# Patient Record
Sex: MALE | Race: WHITE | Employment: UNEMPLOYED | URBAN - METROPOLITAN AREA
[De-identification: names, ages, dates, MRNs, and addresses within clinical notes are randomized per-mention and may not be internally consistent; named-entity substitution may affect disease eponyms.]

---

## 2019-03-26 ENCOUNTER — OFFICE VISIT (OUTPATIENT)
Dept: BEHAVIORAL/MENTAL HEALTH CLINIC | Age: 19
End: 2019-03-26

## 2019-03-26 VITALS
HEIGHT: 67 IN | WEIGHT: 191 LBS | DIASTOLIC BLOOD PRESSURE: 72 MMHG | BODY MASS INDEX: 29.98 KG/M2 | HEART RATE: 91 BPM | SYSTOLIC BLOOD PRESSURE: 122 MMHG

## 2019-03-26 DIAGNOSIS — F39 MOOD DISORDER (HCC): Primary | ICD-10-CM

## 2019-03-26 DIAGNOSIS — F41.9 ANXIETY: ICD-10-CM

## 2019-06-24 ENCOUNTER — OFFICE VISIT (OUTPATIENT)
Dept: BEHAVIORAL/MENTAL HEALTH CLINIC | Age: 19
End: 2019-06-24

## 2019-06-24 VITALS
SYSTOLIC BLOOD PRESSURE: 124 MMHG | HEIGHT: 67 IN | DIASTOLIC BLOOD PRESSURE: 72 MMHG | WEIGHT: 177 LBS | BODY MASS INDEX: 27.78 KG/M2 | HEART RATE: 80 BPM

## 2019-06-24 DIAGNOSIS — F41.9 ANXIETY: ICD-10-CM

## 2019-06-24 DIAGNOSIS — F39 MOOD DISORDER (HCC): Primary | ICD-10-CM

## 2019-06-24 RX ORDER — QUETIAPINE FUMARATE 200 MG/1
200 TABLET, FILM COATED ORAL
Qty: 15 TAB | Refills: 0 | Status: SHIPPED | OUTPATIENT
Start: 2019-06-24 | End: 2019-08-26

## 2019-06-24 RX ORDER — DOXEPIN HYDROCHLORIDE 10 MG/1
10 CAPSULE ORAL
Qty: 30 CAP | Refills: 1 | Status: SHIPPED | OUTPATIENT
Start: 2019-06-24 | End: 2019-07-01 | Stop reason: SINTOL

## 2019-06-24 RX ORDER — QUETIAPINE FUMARATE 100 MG/1
100 TABLET, FILM COATED ORAL
Qty: 15 TAB | Refills: 0 | Status: SHIPPED | OUTPATIENT
Start: 2019-07-08 | End: 2019-07-02

## 2019-06-24 NOTE — PROGRESS NOTES
CHIEF COMPLAINT:  Paloma West is a 20 y.o. male and was seen today for follow-up of psychiatric condition and psychotropic medication management. HPI:    Eliu Nichole reports the following psychiatric symptoms:  depression, anxiety and focus/concentration. The symptoms have been present for months and are of moderate/high severity. The symptoms occur daily. Pt reports noticing anxiety and problems with focus and concentration most significantly. He denies feeling depressed although neuropsych testing picked up on depression symptoms and mother reports noticing depression. Pt reports benefit from use of vyvanse. He states seroquel helps with sleep but does not impact mood. He is here today with his mother to review current treatment plan. FAMILY/SOCIAL HX: denies updates    REVIEW OF SYSTEMS:  Psychiatric:  depression, ADHD, anxiety  Appetite:no change from normal   Sleep: poor with DIMS (difficulty initiating & maintaining sleep) without use of seroquel  Neuro: h/o concussion    Visit Vitals  /72 (BP 1 Location: Left arm, BP Patient Position: Sitting)   Pulse 80   Ht 5' 7\" (1.702 m)   Wt 80.3 kg (177 lb)   BMI 27.72 kg/m²       Side Effects:  none    MENTAL STATUS EXAM:   Sensorium  oriented to time, place and person   Relations cooperative and guarded   Appearance:  age appropriate and casually dressed   Motor Behavior:  hypoactive and within normal limits   Speech:  hypoverbal, monotone and soft   Thought Process: goal directed   Thought Content free of delusions and free of hallucinations   Suicidal ideations no intention   Homicidal ideations no intention   Mood:  anxious   Affect:  anxious, constricted and depressed   Memory recent  adequate   Memory remote:  adequate   Concentration:  adequate   Abstraction:  abstract   Insight:  fair and limited   Reliability fair   Judgment:  fair     MEDICAL DECISION MAKING:  Problems addressed today:    ICD-10-CM ICD-9-CM    1.  Mood disorder ([facility].) F39 296.90 lisdexamfetamine (VYVANSE) 30 mg capsule   2. Anxiety F41.9 300.00        Assessment:   Artice Heimlich is responding to treatment somewhat. Currently adhd symptoms are improved. He continues to have anxiety and atypical depression symptoms. Reviewed neuropsych test results from Dr Jack Lipscomb and pt with ADHD-inattentive type, Major Depression Severe and Anxiety Disorder NOS per test results. As mentioned above pt denies \"feeling depressed\" and PHQ-9 score was elevated at 12 but technically a negative screen. Reviewed risk of using seroquel for sleep only. Agreed to a trial of doxepin and educated pt on potential benefit for depression and anxiety. Will continue with use of vyvanse as he has noticed significant benefit. Reviewed treatment goals and answered questions. Plan:   1. Current Outpatient Medications   Medication Sig Dispense Refill    QUEtiapine (SEROQUEL) 200 mg tablet Take 1 Tab by mouth nightly. 15 Tab 0    [START ON 7/8/2019] QUEtiapine (SEROQUEL) 100 mg tablet Take 1 Tab by mouth nightly. 15 Tab 0    doxepin (SINEQUAN) 10 mg capsule Take 1 Cap by mouth nightly. 30 Cap 1    [START ON 7/24/2019] lisdexamfetamine (VYVANSE) 30 mg capsule Take 1 Cap by mouth daily. Max Daily Amount: 30 mg. 90 Cap 0    aspirin/acetaminophen/caffeine (EXCEDRIN MIGRAINE PO) Take  by mouth.  lisdexamfetamine (VYVANSE) 30 mg capsule Take 1 Cap by mouth every morning. Max Daily Amount: 30 mg. 30 Cap 0    gabapentin (NEURONTIN) 100 mg capsule Take 100 mg by mouth three (3) times daily.  SUMATRIPTAN SUCCINATE (IMITREX PO) Take  by mouth.  OTHER Migralief            medication changes made today: cont vyvanse 30 mg, begin weaning off of seroquel 300 mg, start trial of doxepin 10 mg    2. Counseling and coordination of care including instructions for treatment, risks/benefits, risk factor reduction and patient/family education. He agrees with the plan.  Patient instructed to call with any side effects, questions or issues.      3.    Follow-up and Dispositions    · Return in about 8 weeks (around 8/19/2019).         6/24/2019  Jamari Reyes NP

## 2019-06-28 ENCOUNTER — TELEPHONE (OUTPATIENT)
Dept: BEHAVIORAL/MENTAL HEALTH CLINIC | Age: 19
End: 2019-06-28

## 2019-06-28 NOTE — TELEPHONE ENCOUNTER
Pt's mother left vm regarding doxepin. She said pt said its making him nauseous to the point where he has stopped taking it. Asking for an alternative? Mom can be reached on [phone] or pt can be reached on [phone].

## 2019-07-02 ENCOUNTER — OFFICE VISIT (OUTPATIENT)
Dept: BEHAVIORAL/MENTAL HEALTH CLINIC | Age: 19
End: 2019-07-02

## 2019-07-02 VITALS
SYSTOLIC BLOOD PRESSURE: 119 MMHG | WEIGHT: 174 LBS | DIASTOLIC BLOOD PRESSURE: 76 MMHG | BODY MASS INDEX: 27.31 KG/M2 | HEART RATE: 101 BPM | HEIGHT: 67 IN

## 2019-07-02 DIAGNOSIS — F33.1 MODERATE RECURRENT MAJOR DEPRESSION (HCC): Primary | ICD-10-CM

## 2019-07-02 DIAGNOSIS — F41.1 GENERALIZED ANXIETY DISORDER: ICD-10-CM

## 2019-07-02 RX ORDER — MIRTAZAPINE 7.5 MG/1
7.5 TABLET, FILM COATED ORAL
Qty: 30 TAB | Refills: 1 | Status: SHIPPED | OUTPATIENT
Start: 2019-07-02 | End: 2019-07-03 | Stop reason: SDUPTHER

## 2019-07-02 NOTE — PROGRESS NOTES
CHIEF COMPLAINT:  Felipe Gibson is a 20 y.o. male and was seen today for follow-up of psychiatric condition and psychotropic medication management. HPI:    Cande Fitzpatrick reports the following psychiatric symptoms:  Depression, insomnia, anxiety and focus/concentration. The symptoms have been present for months and are of moderate/high severity. The symptoms occur daily. Pt reports he tried doxepin but had significant SE's from use of medication and still did not sleep. He reports a 4-5 year hx of significant depression and anxiety and stated he did notice with doxepin a \"flicker of feeling better\". Pt here today to review current treatment plan. FAMILY/SOCIAL HX: supportive mother    REVIEW OF SYSTEMS:  Psychiatric:  depression, ADHD, anxiety  Appetite:decreased   Sleep: poor with DIMS (difficulty initiating & maintaining sleep), has been using seroquel with benefit for sleep only   Neuro: h/o concussion    Visit Vitals  /76   Pulse (!) 101   Ht 5' 7\" (1.702 m)   Wt 78.9 kg (174 lb)   BMI 27.25 kg/m²       Side Effects:  Severe nausea from use doxepin    MENTAL STATUS EXAM:   Sensorium  oriented to time, place and person   Relations cooperative   Appearance:  age appropriate and casually dressed   Motor Behavior:  gait stable and within normal limits   Speech:  normal volume   Thought Process: goal directed   Thought Content free of delusions and free of hallucinations   Suicidal ideations no intention   Homicidal ideations no intention   Mood:  anxious and depressed   Affect:  anxious and depressed   Memory recent  adequate   Memory remote:  adequate   Concentration:  adequate with adhd medication   Abstraction:  abstract   Insight:  fair   Reliability fair   Judgment:  fair     MEDICAL DECISION MAKING:  Problems addressed today:    ICD-10-CM ICD-9-CM    1. Moderate recurrent major depression (HCC) F33.1 296.32     R/O bipolar disorder   2.  Generalized anxiety disorder F41.1 300.02        Assessment: Aabenraa is responding to adhd treatment overall. Pt continues with untreated mood disorder. Symptoms present as major depression and DASHAWN. He has had a 4-5 year hx of depression that has significantly impacted his life geraldo socially. As noted he has benefited from use of a stimulant. When he tried doxepin for sleep due to possible SE's of seroquel he experienced sig nausea. Reviewed symptoms and hx of mood presentation. There is a possibility that pt may have more of a bipolar presentation. Reviewed med options and for sleep geraldo ones with possible benefit for depression and anxiety. Will use a trial of mirtazapine. Will monitor mood and response to current trial. Reviewed treatment goals in full and answered questions. Plan:   1. Current Outpatient Medications   Medication Sig Dispense Refill    mirtazapine (REMERON) 7.5 mg tablet Take 1 Tab by mouth nightly. 30 Tab 1    QUEtiapine (SEROQUEL) 200 mg tablet Take 1 Tab by mouth nightly. 15 Tab 0    aspirin/acetaminophen/caffeine (EXCEDRIN MIGRAINE PO) Take  by mouth.  lisdexamfetamine (VYVANSE) 30 mg capsule Take 1 Cap by mouth every morning. Max Daily Amount: 30 mg. 30 Cap 0    OTHER Migralief      gabapentin (NEURONTIN) 100 mg capsule Take 100 mg by mouth three (3) times daily.  SUMATRIPTAN SUCCINATE (IMITREX PO) Take  by mouth.            medication changes made today: dc doxepin, cont vyvanse 30 mg, hold seroquel, start trial of mirtazapine 7.5 mg    2. Counseling and coordination of care including instructions for treatment, risks/benefits, risk factor reduction and patient/family education. He agrees with the plan. Patient instructed to call with any side effects, questions or issues.      3.    Follow-up and Dispositions    · Return in about 1 month (around 7/30/2019).         7/2/2019  Lorraine Gibbons NP

## 2019-07-03 ENCOUNTER — TELEPHONE (OUTPATIENT)
Dept: BEHAVIORAL/MENTAL HEALTH CLINIC | Age: 19
End: 2019-07-03

## 2019-07-03 RX ORDER — MIRTAZAPINE 15 MG/1
15 TABLET, FILM COATED ORAL
Qty: 30 TAB | Refills: 1 | Status: SHIPPED | OUTPATIENT
Start: 2019-07-03 | End: 2019-08-05 | Stop reason: SDUPTHER

## 2019-07-03 NOTE — TELEPHONE ENCOUNTER
Pt called to say the mirtazpine seems to be working but doesn't seem strong enough. Wants to know if he can have a stronger dose.     [phone]

## 2019-07-11 ENCOUNTER — TELEPHONE (OUTPATIENT)
Dept: BEHAVIORAL/MENTAL HEALTH CLINIC | Age: 19
End: 2019-07-11

## 2019-07-11 NOTE — TELEPHONE ENCOUNTER
Pt called to say the mirtazpine is not strong enough. Wants to increase the dose. This was just increased last week on 7/3 as well since it wasn't working.     [phone]

## 2019-08-05 ENCOUNTER — TELEPHONE (OUTPATIENT)
Dept: BEHAVIORAL/MENTAL HEALTH CLINIC | Age: 19
End: 2019-08-05

## 2019-08-05 RX ORDER — MIRTAZAPINE 30 MG/1
30 TABLET, FILM COATED ORAL
Qty: 30 TAB | Refills: 0 | Status: SHIPPED | OUTPATIENT
Start: 2019-08-05 | End: 2019-08-26 | Stop reason: SDUPTHER

## 2019-08-26 ENCOUNTER — OFFICE VISIT (OUTPATIENT)
Dept: BEHAVIORAL/MENTAL HEALTH CLINIC | Age: 19
End: 2019-08-26

## 2019-08-26 VITALS
DIASTOLIC BLOOD PRESSURE: 66 MMHG | HEART RATE: 92 BPM | WEIGHT: 176 LBS | BODY MASS INDEX: 27.62 KG/M2 | HEIGHT: 67 IN | SYSTOLIC BLOOD PRESSURE: 101 MMHG

## 2019-08-26 DIAGNOSIS — F33.1 MODERATE RECURRENT MAJOR DEPRESSION (HCC): Primary | ICD-10-CM

## 2019-08-26 DIAGNOSIS — F39 MOOD DISORDER (HCC): ICD-10-CM

## 2019-08-26 DIAGNOSIS — F41.1 GENERALIZED ANXIETY DISORDER: ICD-10-CM

## 2019-08-26 RX ORDER — MIRTAZAPINE 45 MG/1
45 TABLET, FILM COATED ORAL
Qty: 30 TAB | Refills: 1 | Status: SHIPPED | OUTPATIENT
Start: 2019-08-26 | End: 2019-11-18 | Stop reason: SDUPTHER

## 2019-08-26 NOTE — PROGRESS NOTES
CHIEF COMPLAINT:  Brando Ochoa is a 20 y.o. male and was seen today for follow-up of psychiatric condition and psychotropic medication management. HPI:    Anthony Zhang reports the following psychiatric symptoms:  depression and anxiety. The symptoms have been present for months and are of moderate/high severity. The symptoms occur less frequently and less severely overall. Pt reports depression symptoms have been stabilizing with some intermittent episodes of moderate severity anxiety. Overall he reports benefit from use of mirtazapine. He stopped seroquel and reports benefit from current meds. Pt here today to review current treatment plan. PHQ-9: 4, negative screen, mild symptoms  HAM-A: 9, mild anxiety    FAMILY/SOCIAL HX: no changes reported    REVIEW OF SYSTEMS:  Psychiatric:  depression, anxiety  Appetite:improving   Sleep: improving   Neuro: denies    Visit Vitals  /66   Pulse 92   Ht 5' 7\" (1.702 m)   Wt 79.8 kg (176 lb)   BMI 27.57 kg/m²       Side Effects:  none    MENTAL STATUS EXAM:   Sensorium  oriented to time, place and person   Relations cooperative   Appearance:  age appropriate and casually dressed   Motor Behavior:  gait stable and within normal limits   Speech:  Soft/monotone   Thought Process: goal directed   Thought Content free of delusions and free of hallucinations   Suicidal ideations no intention   Homicidal ideations no intention   Mood:  anxious   Affect:  anxious   Memory recent  adequate   Memory remote:  adequate   Concentration:  adequate   Abstraction:  abstract   Insight:  fair and good   Reliability good and fair   Judgment:  fair and good     MEDICAL DECISION MAKING:  Problems addressed today:    ICD-10-CM ICD-9-CM    1. Moderate recurrent major depression (HCC) F33.1 296.32    2. Generalized anxiety disorder F41.1 300.02    3. Mood disorder (HCC) F39 296.90 lisdexamfetamine (VYVANSE) 30 mg capsule       Assessment:   Anthony Zhang is responding to treatment overall.  Pt reports symptoms are improving with current medications. Reviewed current meds and will increase mirtazapine at this time. Reviewed treatment goals and discussed dosing options. Will first assess anxiety response to higher dose of mirtazapine. Plan:   1. Current Outpatient Medications   Medication Sig Dispense Refill    mirtazapine (REMERON) 45 mg tablet Take 1 Tab by mouth nightly. 30 Tab 1    lisdexamfetamine (VYVANSE) 30 mg capsule Take 1 Cap by mouth every morning. Max Daily Amount: 30 mg. 30 Cap 0    aspirin/acetaminophen/caffeine (EXCEDRIN MIGRAINE PO) Take  by mouth.  gabapentin (NEURONTIN) 100 mg capsule Take 100 mg by mouth three (3) times daily.  SUMATRIPTAN SUCCINATE (IMITREX PO) Take  by mouth.  OTHER Migralief            medication changes made today: inc mirtazapine 45 mg (discussed possibility of breaking dose up if needed), cont vyvanse 30 mg (discussed inc dose if needed)    2. Counseling and coordination of care including instructions for treatment, risks/benefits, risk factor reduction and patient/family education. He agrees with the plan. Patient instructed to call with any side effects, questions or issues.      3.    Follow-up and Dispositions    · Return in about 3 months (around 11/26/2019).         8/26/2019  Rogelio Bird NP

## 2019-09-12 ENCOUNTER — TELEPHONE (OUTPATIENT)
Dept: BEHAVIORAL/MENTAL HEALTH CLINIC | Age: 19
End: 2019-09-12

## 2019-09-12 NOTE — TELEPHONE ENCOUNTER
Returned call. Discussed possibility pt has been using substance. Discussed impact of some substances on neurotransmitter system and possibility of recurrent anxiety/depression. Agreed to work pt in on cancellation list. Pt's mother says she is worried about him but denies concern that he would act on harming himself. Informed her if she starts to worry about self harm she should take him to ED or call Crisis.

## 2019-09-12 NOTE — TELEPHONE ENCOUNTER
Pt's mother called back, gave more details. Judie Quiroga pt is very anxious. Staying in room with door closed more often. Pulling off the side of the road while driving per anxious. Possibly doing marijuana again. Mom said pt said \"these meds shahid been taking for the last 6 years make me wanna kill myself. \"    Pt is on cx list to be seen sooner next week when appt opens and mom is aware of this.     [address] [place] [address]

## 2019-09-12 NOTE — TELEPHONE ENCOUNTER
Pt's mother left vm regarding pt. Ryan Jacquelynbetsy he is still having a lot of anxiety. Wants him to be seen sooner or have something else prescribed. Mom said she is very concerned about him.     [phone]

## 2019-09-18 ENCOUNTER — OFFICE VISIT (OUTPATIENT)
Dept: BEHAVIORAL/MENTAL HEALTH CLINIC | Age: 19
End: 2019-09-18

## 2019-09-18 VITALS
DIASTOLIC BLOOD PRESSURE: 77 MMHG | HEIGHT: 70 IN | SYSTOLIC BLOOD PRESSURE: 128 MMHG | HEART RATE: 78 BPM | BODY MASS INDEX: 25.05 KG/M2 | WEIGHT: 175 LBS

## 2019-09-18 DIAGNOSIS — F33.1 MODERATE RECURRENT MAJOR DEPRESSION (HCC): Primary | ICD-10-CM

## 2019-09-18 DIAGNOSIS — F41.1 GENERALIZED ANXIETY DISORDER: ICD-10-CM

## 2019-09-18 DIAGNOSIS — F39 MOOD DISORDER (HCC): ICD-10-CM

## 2019-09-18 RX ORDER — BUSPIRONE HYDROCHLORIDE 10 MG/1
10 TABLET ORAL 2 TIMES DAILY
Qty: 60 TAB | Refills: 1 | Status: SHIPPED | OUTPATIENT
Start: 2019-09-18 | End: 2019-10-30 | Stop reason: SINTOL

## 2019-09-18 NOTE — PROGRESS NOTES
CHIEF COMPLAINT:  Dayana Gallardo is a 20 y.o. male and was seen today for follow-up of psychiatric condition and psychotropic medication management. HPI:    Mahamed Hernandez reports the following psychiatric symptoms: focus/concentration, depression and anxiety. The symptoms have been present for months and are of moderate/high severity. The depression symptoms occur less frequently and less severely overall. Pt reports anxiety symptoms though are exacerbated. He reports benefit from current medications but is here today to review current treatment plan and explore options for anxiety management. FAMILY/SOCIAL HX: no changes, reports work is stressful    REVIEW OF SYSTEMS:  Psychiatric:  depression, ADHD, anxiety  Appetite:no change from normal   Sleep: improved   Neuro: denies    Visit Vitals  /77 (BP 1 Location: Left arm, BP Patient Position: Sitting)   Pulse 78   Ht 5' 10\" (1.778 m)   Wt 79.4 kg (175 lb)   BMI 25.11 kg/m²       Side Effects:  none    MENTAL STATUS EXAM:   Sensorium  oriented to time, place and person   Relations cooperative, vague and \"quiet\"   Appearance:  age appropriate and casually dressed   Motor Behavior:  gait stable and within normal limits   Speech:  soft and barely audible   Thought Process: goal directed   Thought Content free of delusions and free of hallucinations   Suicidal ideations no intention   Homicidal ideations no intention   Mood:  anxious   Affect:  anxious   Memory recent  adequate   Memory remote:  adequate   Concentration:  adequate   Abstraction:  abstract   Insight:  fair   Reliability fair   Judgment:  fair     MEDICAL DECISION MAKING:  Problems addressed today:    ICD-10-CM ICD-9-CM    1. Moderate recurrent major depression (HCC) F33.1 296.32    2. Generalized anxiety disorder F41.1 300.02    3. Mood disorder (HCC) F39 296.90 lisdexamfetamine (VYVANSE) 30 mg capsule       Assessment:   Mahamed Hernandez is responding to treatment overall.  He reports depression is improved. He states he has been experiencing increased anxiety. Reviewed current response and possibility of additive factors. Pt has been using caffeine throughout the day on a daily basis. He reports he has some intermittent THC use as well. Reviewed caffien usage and discussed strategies to reduce daily amount as this may be contributing to anxiety. Pt also advised not to use THC as it may be contributing to changes to meds/response. Discussed adding buspar as anxiety has been chronic. Pt would like to start at this time. Reviewed treatment goals and symptoms to monitor for. Plan:   1. Current Outpatient Medications   Medication Sig Dispense Refill    busPIRone (BUSPAR) 10 mg tablet Take 1 Tab by mouth two (2) times a day. 60 Tab 1    [START ON 9/26/2019] lisdexamfetamine (VYVANSE) 30 mg capsule Take 1 Cap by mouth daily. Max Daily Amount: 30 mg. 30 Cap 0    mirtazapine (REMERON) 45 mg tablet Take 1 Tab by mouth nightly. 30 Tab 1    aspirin/acetaminophen/caffeine (EXCEDRIN MIGRAINE PO) Take  by mouth.            medication changes made today: mirtazapine 45 mg, cont vyvanse 30 mg, add buspar 10 mg bid, consider rexulti if needed    2. Counseling and coordination of care including instructions for treatment, risks/benefits, risk factor reduction and patient/family education. He agrees with the plan. Patient instructed to call with any side effects, questions or issues. 3.    Follow-up and Dispositions    · Return in about 8 weeks (around 11/13/2019).          9/18/2019  Violet Landin NP

## 2019-10-07 ENCOUNTER — TELEPHONE (OUTPATIENT)
Dept: BEHAVIORAL/MENTAL HEALTH CLINIC | Age: 19
End: 2019-10-07

## 2019-10-07 NOTE — TELEPHONE ENCOUNTER
Patient calls to report the Buspar doesn't seem to be working, wondering if the dose should be increased.

## 2019-10-07 NOTE — TELEPHONE ENCOUNTER
Returned call. Informed pt he can increase Buspar to 15 mg BID. He will call with feedback in 1-2 weeks.

## 2019-10-14 ENCOUNTER — TELEPHONE (OUTPATIENT)
Dept: BEHAVIORAL/MENTAL HEALTH CLINIC | Age: 19
End: 2019-10-14

## 2019-10-14 NOTE — TELEPHONE ENCOUNTER
Noted. Will review anxiety symptoms at next appointment. If pt continues to have symptoms please have him move f/u appt sooner. Thank you.

## 2019-10-14 NOTE — TELEPHONE ENCOUNTER
Pt called to say the buspar still isnt working even after the increase. Shayna Villela its making him dizzy. Said he stopped taking it.       [phone]

## 2019-10-29 ENCOUNTER — TELEPHONE (OUTPATIENT)
Dept: BEHAVIORAL/MENTAL HEALTH CLINIC | Age: 19
End: 2019-10-29

## 2019-10-29 NOTE — TELEPHONE ENCOUNTER
Pt calls to say buspar not helping. Its making him really dizzy. Pt is scheduled for an appt in 3 weeks.     [phone]

## 2019-10-30 NOTE — TELEPHONE ENCOUNTER
Returned call. Pt reports SE's from buspar so encouraged him to dc at this time. Asked pt to call  in am and move f/u appt sooner. He agreed to do so. Will consider gabapentin for chronic anxiety.

## 2019-11-18 ENCOUNTER — OFFICE VISIT (OUTPATIENT)
Dept: BEHAVIORAL/MENTAL HEALTH CLINIC | Age: 19
End: 2019-11-18

## 2019-11-18 VITALS
DIASTOLIC BLOOD PRESSURE: 81 MMHG | SYSTOLIC BLOOD PRESSURE: 111 MMHG | WEIGHT: 179 LBS | BODY MASS INDEX: 25.62 KG/M2 | HEART RATE: 83 BPM | HEIGHT: 70 IN

## 2019-11-18 DIAGNOSIS — F39 MOOD DISORDER (HCC): ICD-10-CM

## 2019-11-18 DIAGNOSIS — F90.8 ATTENTION DEFICIT HYPERACTIVITY DISORDER (ADHD), OTHER TYPE: ICD-10-CM

## 2019-11-18 DIAGNOSIS — F33.1 MODERATE RECURRENT MAJOR DEPRESSION (HCC): Primary | ICD-10-CM

## 2019-11-18 DIAGNOSIS — F41.1 GENERALIZED ANXIETY DISORDER: ICD-10-CM

## 2019-11-18 RX ORDER — MIRTAZAPINE 30 MG/1
60 TABLET, FILM COATED ORAL
Qty: 60 TAB | Refills: 2 | Status: SHIPPED | OUTPATIENT
Start: 2019-11-18 | End: 2020-01-29 | Stop reason: SDUPTHER

## 2019-11-18 RX ORDER — HYDROXYZINE PAMOATE 25 MG/1
25 CAPSULE ORAL
Qty: 60 CAP | Refills: 2 | Status: SHIPPED | OUTPATIENT
Start: 2019-11-18 | End: 2020-01-29

## 2019-11-18 RX ORDER — BUSPIRONE HYDROCHLORIDE 10 MG/1
10 TABLET ORAL
Qty: 30 TAB | Refills: 1 | Status: SHIPPED | OUTPATIENT
Start: 2019-11-18 | End: 2020-01-29 | Stop reason: SDUPTHER

## 2019-11-18 NOTE — PROGRESS NOTES
CHIEF COMPLAINT:  Ada Gutierrez is a 20 y.o. male and was seen today for follow-up of psychiatric condition and psychotropic medication management. HPI:    Valentín Thomas reports the following psychiatric symptoms:  depression, anxiety and focus and concentration. The symptoms have been present for months and are of moderate/high severity. The symptoms occur less severely and frequently overall. Pt reports some ongoing issues with anxiety. He has had some benefit from use of buspar but he notices SE's when he takes it during the daytime. Overall he reports benefit from current medications. He is here today to review current treatment plan. FAMILY/SOCIAL HX: denies new stressors/changes    REVIEW OF SYSTEMS:  Psychiatric:  depression, anxiety, adhd  Appetite:good, improved   Sleep: improved, has been using buspar at night with benefit   Neuro: hx concussions    Visit Vitals  /81 (BP 1 Location: Left arm, BP Patient Position: Sitting)   Pulse 83   Ht 5' 10\" (1.778 m)   Wt 81.2 kg (179 lb)   BMI 25.68 kg/m²       Side Effects:  dizziness    MENTAL STATUS EXAM:   Sensorium  oriented to time, place and person   Relations cooperative   Appearance:  age appropriate and casually dressed   Motor Behavior:  gait stable and within normal limits   Speech:  hypoverbal, monotone and soft   Thought Process: goal directed   Thought Content free of delusions and free of hallucinations   Suicidal ideations no intention   Homicidal ideations no intention   Mood:  anxious   Affect:  Anxious, sad, constricted   Memory recent  adequate   Memory remote:  adequate   Concentration:  adequate and impaired   Abstraction:  abstract and concrete   Insight:  fair   Reliability fair/limited   Judgment:  fair and limited     MEDICAL DECISION MAKING:  Problems addressed today:    ICD-10-CM ICD-9-CM    1. Moderate recurrent major depression (HCC) F33.1 296.32    2. Generalized anxiety disorder F41.1 300.02    3.  Attention deficit hyperactivity disorder (ADHD), other type F90.8 314.01    4. Mood disorder (formerly [facility]) F39 296.90 lisdexamfetamine (VYVANSE) 30 mg capsule       Assessment:   Winston Simental is responding to treatment overall. Mirtazapine and vyvanse have been beneficial for overall depression and adhd symptoms. Pt still with ongoing anxiety. Reviewed current response to medication and SE's. For now will use buspar prn sleep as this has been beneficial. Will increase mirtazapine for depression and anxiety benefit. Will add prn hydroxyzine for anxiety. Will also consider lowering stimulant dose if needed. Discussed polypharmacy and dc'ing buspar if hydroxyzine works well. Discussed use of THC and pt states he stopped use approx 1 month ago. He agrees to monitor response and SE's. Reviewed treatment goals and answered questions. Provided support and encouragement. Plan:   1. Current Outpatient Medications   Medication Sig Dispense Refill    mirtazapine (REMERON) 30 mg tablet Take 2 Tabs by mouth nightly. 60 Tab 2    lisdexamfetamine (VYVANSE) 30 mg capsule Take 1 Cap by mouth daily. Max Daily Amount: 30 mg. 30 Cap 0    busPIRone (BUSPAR) 10 mg tablet Take 1 Tab by mouth nightly as needed (anxiety/sleep). 30 Tab 1    hydrOXYzine pamoate (VISTARIL) 25 mg capsule Take 1 Cap by mouth two (2) times daily as needed for Anxiety. 60 Cap 2    aspirin/acetaminophen/caffeine (EXCEDRIN MIGRAINE PO) Take  by mouth.            medication changes made today: inc mirtazapine 60 mg, cont vyvanse 30 mg, cont buspar 10 mg prn bedtime, add hydroxyzinr 25 mg bid prn, consider rexulti if needed, consider lowering stimulant dose, consider pharmacogenomic test    2. Counseling and coordination of care including instructions for treatment, risks/benefits, risk factor reduction and patient/family education. He agrees with the plan. Patient instructed to call with any side effects, questions or issues.      3.    Follow-up and Dispositions    · Return in about 2 months (around 1/18/2020).          11/18/2019  Lacey Okeefe, NP

## 2020-01-06 DIAGNOSIS — F39 MOOD DISORDER (HCC): ICD-10-CM

## 2020-01-29 ENCOUNTER — OFFICE VISIT (OUTPATIENT)
Dept: BEHAVIORAL/MENTAL HEALTH CLINIC | Age: 20
End: 2020-01-29

## 2020-01-29 VITALS
DIASTOLIC BLOOD PRESSURE: 77 MMHG | WEIGHT: 170 LBS | HEART RATE: 91 BPM | HEIGHT: 70 IN | BODY MASS INDEX: 24.34 KG/M2 | SYSTOLIC BLOOD PRESSURE: 119 MMHG

## 2020-01-29 DIAGNOSIS — F90.8 ATTENTION DEFICIT HYPERACTIVITY DISORDER (ADHD), OTHER TYPE: ICD-10-CM

## 2020-01-29 DIAGNOSIS — F33.1 MODERATE RECURRENT MAJOR DEPRESSION (HCC): Primary | ICD-10-CM

## 2020-01-29 DIAGNOSIS — F41.1 GENERALIZED ANXIETY DISORDER: ICD-10-CM

## 2020-01-29 RX ORDER — BUSPIRONE HYDROCHLORIDE 10 MG/1
10 TABLET ORAL
Qty: 30 TAB | Refills: 1 | Status: SHIPPED | OUTPATIENT
Start: 2020-01-29 | End: 2020-04-01 | Stop reason: SDUPTHER

## 2020-01-29 RX ORDER — MIRTAZAPINE 30 MG/1
60 TABLET, FILM COATED ORAL
Qty: 60 TAB | Refills: 2 | Status: SHIPPED | OUTPATIENT
Start: 2020-01-29 | End: 2020-04-01 | Stop reason: SDUPTHER

## 2020-01-29 RX ORDER — AMPHETAMINE SULFATE 5 MG/1
5 TABLET ORAL 2 TIMES DAILY
Qty: 60 TAB | Refills: 0 | Status: SHIPPED | OUTPATIENT
Start: 2020-01-29 | End: 2020-04-01 | Stop reason: DRUGHIGH

## 2020-01-29 NOTE — PROGRESS NOTES
CHIEF COMPLAINT:  Elidia Rankin is a 20 y.o. male and was seen today for follow-up of psychiatric condition and psychotropic medication management. HPI:    Kaela Smyth reports the following psychiatric symptoms:  depression, anxiety and focus and concentration. The symptoms have been present for months and are of moderate severity. The symptoms occur daily overall. Pt reports benefit from use of vyvanse and mirtazapine but reports ongoing anhedonia. He states he notices he feels better with use of vyvanse but finds it wears off and then he has no motivation/energy. Overall he reports mirtazapine and buspar have been effective for depression and anxiety. He is here today to review current treatment plan. FAMILY/SOCIAL HX: works FT, limited social connections    REVIEW OF SYSTEMS:  Psychiatric:  depression, ADHD, anxiety  Appetite:good/fair   Sleep: improved with use of mirtazapine  Neuro: hx of concussion    Visit Vitals  /77 (BP 1 Location: Left arm, BP Patient Position: Sitting)   Pulse 91   Ht 5' 10\" (1.778 m)   Wt 77.1 kg (170 lb)   BMI 24.39 kg/m²       Side Effects:  increased HR with vyvanse at times    MENTAL STATUS EXAM:   Sensorium  oriented to time, place and person   Relations cooperative   Appearance:  age appropriate and casually dressed   Motor Behavior:  gait stable and within normal limits   Speech:  monotone and soft   Thought Process: goal directed   Thought Content free of delusions and free of hallucinations   Suicidal ideations no intention   Homicidal ideations no intention   Mood:  anxious and depressed   Affect:  anxious and depressed   Memory recent  adequate   Memory remote:  adequate   Concentration:  impaired   Abstraction:  abstract   Insight:  fair   Reliability fair   Judgment:  fair     MEDICAL DECISION MAKING:  Problems addressed today:    ICD-10-CM ICD-9-CM    1. Moderate recurrent major depression (HCC) F33.1 296.32    2. Generalized anxiety disorder F41.1 300.02    3. Attention deficit hyperactivity disorder (ADHD), other type F90.8 314.01 amphetamine sulfate (EVEKEO) 5 mg tab       Assessment:   Otto Mullen is responding to treatment somewhat. Currently anhedonia symptoms are present. He reports mood feels lass sad and overall he is less anxious. Reviewed current medications and he reports benefit from all medications but has noticed increased HR at times with use of vyvanse. Discussed management of anhedonia while trying to limit polypharmacy. At this time will switch vyvanse to [facility] and use bid dosing to give better coverage. If this does not work well may consider use of abilify. Reviewed treatment goals and target symptoms. Pt will contact office if there are issues with switching stimulant. He denies use of THC. Provided support and answered questions. Will monitor VS which are currently stable with intermittent increased HR per patient. Plan:   1. Current Outpatient Medications   Medication Sig Dispense Refill    amphetamine sulfate (EVEKEO) 5 mg tab Take 5 mg by mouth two (2) times a day. Max Daily Amount: 10 mg. 60 Tab 0    mirtazapine (REMERON) 30 mg tablet Take 2 Tabs by mouth nightly. 60 Tab 2    busPIRone (BUSPAR) 10 mg tablet Take 1 Tab by mouth nightly as needed (anxiety/sleep). 30 Tab 1    lisdexamfetamine (VYVANSE) 30 mg capsule Take 1 Cap by mouth daily. Max Daily Amount: 30 mg. 30 Cap 0    aspirin/acetaminophen/caffeine (EXCEDRIN MIGRAINE PO) Take  by mouth.            medication changes made today: cont mirtazapine 30 mg, cont buspar 10 mg nightly as needed, switch vyvanse to evekeo 10 mg BID    2. Counseling and coordination of care including instructions for treatment, risks/benefits, risk factor reduction and patient/family education. He agrees with the plan. Patient instructed to call with any side effects, questions or issues. 3.    Follow-up and Dispositions    · Return in about 8 weeks (around 3/25/2020).          1/29/2020  Christina Birmingham Nonda Share, NP

## 2020-04-01 ENCOUNTER — VIRTUAL VISIT (OUTPATIENT)
Dept: BEHAVIORAL/MENTAL HEALTH CLINIC | Age: 20
End: 2020-04-01

## 2020-04-01 DIAGNOSIS — F90.8 ATTENTION DEFICIT HYPERACTIVITY DISORDER (ADHD), OTHER TYPE: Primary | ICD-10-CM

## 2020-04-01 DIAGNOSIS — F41.1 GENERALIZED ANXIETY DISORDER: ICD-10-CM

## 2020-04-01 DIAGNOSIS — F33.1 MODERATE RECURRENT MAJOR DEPRESSION (HCC): ICD-10-CM

## 2020-04-01 RX ORDER — MIRTAZAPINE 30 MG/1
60 TABLET, FILM COATED ORAL
Qty: 60 TAB | Refills: 2 | Status: SHIPPED | OUTPATIENT
Start: 2020-04-01 | End: 2020-07-13 | Stop reason: SDUPTHER

## 2020-04-01 RX ORDER — BUSPIRONE HYDROCHLORIDE 10 MG/1
10 TABLET ORAL
Qty: 30 TAB | Refills: 1 | Status: SHIPPED | OUTPATIENT
Start: 2020-04-01 | End: 2020-06-09 | Stop reason: SDUPTHER

## 2020-04-01 RX ORDER — AMPHETAMINE SULFATE 10 MG/1
10 TABLET ORAL DAILY
Qty: 30 TAB | Refills: 0 | Status: SHIPPED | OUTPATIENT
Start: 2020-05-31 | End: 2020-04-22 | Stop reason: SDUPTHER

## 2020-04-01 NOTE — PROGRESS NOTES
Merla Carrel is a 20 y.o. male evaluated via telephone on 4/1/2020. Consent:  He and/or health care decision maker is aware that that he may receive a bill for this telephone service, depending on his insurance coverage, and has provided verbal consent to proceed: Yes      Documentation:  I communicated with the patient and/or health care decision maker about current medications and comprehensive approach to managing depression. Details of this discussion including any medical advice provided: reviewed current medications. Will switch dosing of evekeo to 10 mg in am as pt is not eating dinner most nights due to decreased appetite. No changes to buspar or mirtazapine. Discussed importance of nutrition (geraldo now that he is working out) and adequate calorie intake for good MH. Also discussed starting ind therapy and increasing social connections. Agreed to review antidepressant at next visit if no significant improvement from above. Current Outpatient Medications on File Prior to Visit   Medication Sig Dispense Refill    [DISCONTINUED] amphetamine sulfate (EVEKEO) 5 mg tab Take 5 mg by mouth two (2) times a day. Max Daily Amount: 10 mg. 60 Tab 0    [DISCONTINUED] mirtazapine (REMERON) 30 mg tablet Take 2 Tabs by mouth nightly. 60 Tab 2    [DISCONTINUED] busPIRone (BUSPAR) 10 mg tablet Take 1 Tab by mouth nightly as needed (anxiety/sleep). 30 Tab 1    [DISCONTINUED] lisdexamfetamine (VYVANSE) 30 mg capsule Take 1 Cap by mouth daily. Max Daily Amount: 30 mg. 30 Cap 0    aspirin/acetaminophen/caffeine (EXCEDRIN MIGRAINE PO) Take  by mouth. No current facility-administered medications on file prior to visit. I affirm this is a Patient Initiated Episode with an Established Patient who has not had a related appointment within my department in the past 7 days or scheduled within the next 24 hours.     Total Time: minutes: 11-20 minutes    Note: not billable if this call serves to triage the patient into an appointment for the relevant concern      Melanie Joseph, NP

## 2020-04-22 DIAGNOSIS — F90.8 ATTENTION DEFICIT HYPERACTIVITY DISORDER (ADHD), OTHER TYPE: ICD-10-CM

## 2020-04-22 RX ORDER — AMPHETAMINE SULFATE 10 MG/1
10 TABLET ORAL DAILY
Qty: 30 TAB | Refills: 0 | Status: SHIPPED | OUTPATIENT
Start: 2020-05-31 | End: 2020-04-28 | Stop reason: SDUPTHER

## 2020-04-28 ENCOUNTER — TELEPHONE (OUTPATIENT)
Dept: BEHAVIORAL/MENTAL HEALTH CLINIC | Age: 20
End: 2020-04-28

## 2020-04-28 DIAGNOSIS — F90.8 ATTENTION DEFICIT HYPERACTIVITY DISORDER (ADHD), OTHER TYPE: ICD-10-CM

## 2020-04-28 RX ORDER — AMPHETAMINE SULFATE 10 MG/1
10 TABLET ORAL DAILY
Qty: 30 TAB | Refills: 0 | Status: SHIPPED | OUTPATIENT
Start: 2020-04-28 | End: 2020-09-17 | Stop reason: CLARIF

## 2020-04-28 NOTE — TELEPHONE ENCOUNTER
Pt calls to say his amphetamine sulfate was sent with a start date of 5/31 and he says he needs it now?

## 2020-06-09 RX ORDER — BUSPIRONE HYDROCHLORIDE 10 MG/1
10 TABLET ORAL
Qty: 30 TAB | Refills: 1 | Status: SHIPPED | OUTPATIENT
Start: 2020-06-09 | End: 2020-08-17 | Stop reason: SDUPTHER

## 2020-07-13 RX ORDER — MIRTAZAPINE 30 MG/1
60 TABLET, FILM COATED ORAL
Qty: 60 TAB | Refills: 0 | Status: SHIPPED | OUTPATIENT
Start: 2020-07-13 | End: 2020-08-17 | Stop reason: SDUPTHER

## 2020-08-17 RX ORDER — BUSPIRONE HYDROCHLORIDE 10 MG/1
10 TABLET ORAL
Qty: 30 TAB | Refills: 0 | Status: SHIPPED | OUTPATIENT
Start: 2020-08-17 | End: 2020-09-17 | Stop reason: SDUPTHER

## 2020-08-17 RX ORDER — MIRTAZAPINE 30 MG/1
60 TABLET, FILM COATED ORAL
Qty: 60 TAB | Refills: 0 | Status: SHIPPED | OUTPATIENT
Start: 2020-08-17 | End: 2020-09-17 | Stop reason: SDUPTHER

## 2020-09-17 ENCOUNTER — VIRTUAL VISIT (OUTPATIENT)
Dept: BEHAVIORAL/MENTAL HEALTH CLINIC | Age: 20
End: 2020-09-17
Payer: COMMERCIAL

## 2020-09-17 DIAGNOSIS — F90.8 ATTENTION DEFICIT HYPERACTIVITY DISORDER (ADHD), OTHER TYPE: Primary | ICD-10-CM

## 2020-09-17 DIAGNOSIS — F33.1 MODERATE RECURRENT MAJOR DEPRESSION (HCC): ICD-10-CM

## 2020-09-17 DIAGNOSIS — F41.1 GENERALIZED ANXIETY DISORDER: ICD-10-CM

## 2020-09-17 DIAGNOSIS — F39 MOOD DISORDER (HCC): ICD-10-CM

## 2020-09-17 PROCEDURE — 99442 PR PHYS/QHP TELEPHONE EVALUATION 11-20 MIN: CPT | Performed by: NURSE PRACTITIONER

## 2020-09-17 RX ORDER — MIRTAZAPINE 30 MG/1
60 TABLET, FILM COATED ORAL
Qty: 60 TAB | Refills: 3 | Status: SHIPPED | OUTPATIENT
Start: 2020-09-17 | End: 2021-01-04

## 2020-09-17 RX ORDER — BUSPIRONE HYDROCHLORIDE 10 MG/1
10 TABLET ORAL
Qty: 30 TAB | Refills: 3 | Status: SHIPPED | OUTPATIENT
Start: 2020-09-17 | End: 2021-01-18 | Stop reason: SDUPTHER

## 2020-09-17 RX ORDER — HYDROXYZINE HYDROCHLORIDE 10 MG/1
10 TABLET, FILM COATED ORAL
Qty: 30 TAB | Refills: 1 | Status: SHIPPED | OUTPATIENT
Start: 2020-09-17 | End: 2020-09-27

## 2020-09-17 NOTE — PROGRESS NOTES
Lucero Hanna is a 23 y.o. male, evaluated via audio-only technology on 9/17/2020 for Medication Management  . Assessment & Plan:   Management of adhd, depression and anxiety. Pt reports overall symptoms of anxiety and adhd are exacerbated. Sleep and depression are fairly stable with mirtazapine. Reviewed meds and will cont with buspar/mirtazapine at bedtime. He can't take buspar in am due to drowsiness so will use a low dose of hydroxyzine. Reviewed start of a new medication and dosing range. Will also switch back to vyvanse for adhd management. Reinforced healthy strategies for anxiety management. 12  Subjective:   Pt reports he did not benefit from trial of Evekeo and he would like to return to Vyvanse. Overall he states depression is better and describes it as a \"5\" on a 0 (no depression) to 10 (severe depression) scale. He has had an exacerbation of anxiety. He has been exercising and trying to stay active to minimize it but overall it is moderate high severity. He thinks it is partly due to limited benefit from stimulant. Prior to Admission medications    Medication Sig Start Date End Date Taking? Authorizing Provider   lisdexamfetamine (Vyvanse) 30 mg capsule Take 1 Cap by mouth daily. Max Daily Amount: 30 mg. 9/17/20  Yes Peyton Torres NP   busPIRone (BUSPAR) 10 mg tablet Take 1 Tab by mouth nightly as needed (anxiety/sleep). 9/17/20  Yes Peyton Torres NP   mirtazapine (REMERON) 30 mg tablet Take 2 Tabs by mouth nightly. 9/17/20  Yes Peyton Torres NP   hydrOXYzine HCL (ATARAX) 10 mg tablet Take 1 Tab by mouth two (2) times daily as needed for Anxiety for up to 10 days. 9/17/20 9/27/20 Yes Peyton Torres NP   busPIRone (BUSPAR) 10 mg tablet Take 1 Tab by mouth nightly as needed (anxiety/sleep). 8/17/20 9/17/20  Peyton Torres NP   mirtazapine (REMERON) 30 mg tablet Take 2 Tabs by mouth nightly.  8/17/20 9/17/20  Peyton Torres NP   amphetamine sulfate 10 mg tab Take 10 mg by mouth daily. Max Daily Amount: 10 mg. 4/28/20 9/17/20  Issa Torres NP   aspirin/acetaminophen/caffeine (EXCEDRIN MIGRAINE PO) Take  by mouth. Provider, Historical       ICD-10-CM ICD-9-CM    1. Attention deficit hyperactivity disorder (ADHD), other type  F90.8 314.01    2. Moderate recurrent major depression (HCC)  F33.1 296.32    3. Generalized anxiety disorder  F41.1 300.02    4. Mood disorder (HCC)  F39 296.90 lisdexamfetamine (Vyvanse) 30 mg capsule       ROS: depression, anxiety, adhd    No flowsheet data found. Judy Hummel, who was evaluated through a patient-initiated, synchronous (real-time) audio only encounter, and/or her healthcare decision maker, is aware that it is a billable service, with coverage as determined by his insurance carrier. He provided verbal consent to proceed: Yes. He has not had a related appointment within my department in the past 7 days or scheduled within the next 24 hours.       Total Time: minutes: 11-20 minutes    Tyrone Loco NP

## 2020-10-21 DIAGNOSIS — F39 MOOD DISORDER (HCC): ICD-10-CM

## 2020-11-18 DIAGNOSIS — F39 MOOD DISORDER (HCC): ICD-10-CM

## 2020-11-18 NOTE — TELEPHONE ENCOUNTER
Pt calls for Vyvanse refill   pulled  [facility] 10/21/20  For #30   Last ov 9/17/20    No follow up set yet

## 2020-12-21 DIAGNOSIS — F39 MOOD DISORDER (HCC): ICD-10-CM

## 2020-12-21 NOTE — TELEPHONE ENCOUNTER
Requested Prescriptions     Pending Prescriptions Disp Refills    lisdexamfetamine (Vyvanse) 30 mg capsule 30 Cap 0     Sig: Take 1 Cap by mouth daily. Max Daily Amount: 30 mg.

## 2020-12-21 NOTE — TELEPHONE ENCOUNTER
Requested Prescriptions     Pending Prescriptions Disp Refills    lisdexamfetamine (Vyvanse) 30 mg capsule 30 Cap 0     Sig: Take 1 Cap by mouth daily. Max Daily Amount: 30 mg. Last visit:  09.17.20  Next visit: Left  for patient to retun call to schedule f/u appt.     :    11/19/2020 1 11/18/2020 Vyvanse 30 Mg Capsule   30.00 30 Pa All [id number] Kro (2500) 0 [facility] [place]     10/21/2020 1 10/21/2020 Vyvanse 30 Mg Capsule   30.00 30 Pa All [id number] [facility] (2500[place]     09/17/2020 1 09/17/2020 Vyvanse 30 Mg Capsule   30.00 30 Pa All

## 2021-01-04 RX ORDER — MIRTAZAPINE 30 MG/1
TABLET, FILM COATED ORAL
Qty: 60 TAB | Refills: 0 | Status: SHIPPED | OUTPATIENT
Start: 2021-01-04 | End: 2021-01-26 | Stop reason: SDUPTHER

## 2021-01-18 DIAGNOSIS — F39 MOOD DISORDER (HCC): ICD-10-CM

## 2021-01-18 RX ORDER — BUSPIRONE HYDROCHLORIDE 10 MG/1
10 TABLET ORAL
Qty: 30 TAB | Refills: 3 | Status: SHIPPED | OUTPATIENT
Start: 2021-01-18 | End: 2021-05-19

## 2021-01-18 NOTE — TELEPHONE ENCOUNTER
Pt calls for refills of Vyvanse and Buspar  Vyvanse LRD according to    10/21  11/19  12/21         Next fu with [place] is set for 1/26/21  Last ov was 9/17/2020

## 2021-01-26 ENCOUNTER — VIRTUAL VISIT (OUTPATIENT)
Dept: BEHAVIORAL/MENTAL HEALTH CLINIC | Age: 21
End: 2021-01-26
Payer: COMMERCIAL

## 2021-01-26 DIAGNOSIS — F33.1 MODERATE RECURRENT MAJOR DEPRESSION (HCC): Primary | ICD-10-CM

## 2021-01-26 DIAGNOSIS — F90.8 ATTENTION DEFICIT HYPERACTIVITY DISORDER (ADHD), OTHER TYPE: ICD-10-CM

## 2021-01-26 DIAGNOSIS — F39 MOOD DISORDER (HCC): ICD-10-CM

## 2021-01-26 DIAGNOSIS — F41.1 GENERALIZED ANXIETY DISORDER: ICD-10-CM

## 2021-01-26 PROCEDURE — 99214 OFFICE O/P EST MOD 30 MIN: CPT | Performed by: NURSE PRACTITIONER

## 2021-01-26 RX ORDER — MIRTAZAPINE 30 MG/1
TABLET, FILM COATED ORAL
Qty: 60 TAB | Refills: 5 | Status: SHIPPED | OUTPATIENT
Start: 2021-01-26 | End: 2021-09-17 | Stop reason: SDUPTHER

## 2021-01-26 NOTE — PROGRESS NOTES
CHIEF COMPLAINT:  Citlalli Ballesteros is a 23 y.o. male and was seen today for follow-up of psychiatric condition and psychotropic medication management. HPI:    Lee Houston reports the following psychiatric symptoms by hx:  depression, anxiety and focus/concentration. Overall symptoms have been present for months. Currently depression and anxiety are of moderate/low severity and occur less frequently. Pt states his anxiety levels have been down over the past month. ADHD symptoms are mod/high severity. They occur daily but are well managed at this time with current medication. Pt reports medications are beneficial. Met with pt via video telehelath for appt today. FAMILY/SOCIAL HX: no new stressors    REVIEW OF SYSTEMS:  Psychiatric:  depression, ADHD, anxiety  Appetite:good, improving  Sleep: fitful intermittently  Neuro: stable, no updates      Side Effects:  none    MENTAL STATUS EXAM:   Sensorium  oriented to time, place and person   Relations cooperative   Appearance:  age appropriate and casually dressed   Motor Behavior:  gait stable and within normal limits   Speech:  Soft, less monotone   Thought Process: goal directed   Thought Content free of delusions and free of hallucinations   Suicidal ideations no intention   Homicidal ideations no intention   Mood:  within normal limits   Affect:  wnl   Memory recent  adequate   Memory remote:  adequate   Concentration:  impaired, improved with med management   Abstraction:  abstract   Insight:  good   Reliability good   Judgment:  good     MEDICAL DECISION MAKING:  Problems addressed today:    ICD-10-CM ICD-9-CM    1. Moderate recurrent major depression (HCC)  F33.1 296.32    2. Generalized anxiety disorder  F41.1 300.02    3. Attention deficit hyperactivity disorder (ADHD), other type  F90.8 314.01    4. Mood disorder (HCC)  F39 296.90 lisdexamfetamine (Vyvanse) 30 mg capsule       Assessment:   Lee Houston is responding to treatment.  Symptoms are improving and more stable today than in past. Discussed current medications and dosages. No changes required. Pt was experiencing some increased anxiety and so discussed in future using buspar bid if needed. Discussed reviewing meds at next appt and exploring maintenance dosing if symptoms remain at this level. Reviewed treatment goals and target symptoms to monitor for. Plan:   1. Current Outpatient Medications   Medication Sig Dispense Refill    mirtazapine (REMERON) 30 mg tablet TAKE TWO TABLETS BY MOUTH ONCE NIGHTLY 60 Tab 5    [START ON 2/17/2021] lisdexamfetamine (Vyvanse) 30 mg capsule Take 1 Cap by mouth daily. Max Daily Amount: 30 mg. 30 Cap 0    busPIRone (BUSPAR) 10 mg tablet Take 1 Tab by mouth nightly as needed (anxiety/sleep). 30 Tab 3    aspirin/acetaminophen/caffeine (EXCEDRIN MIGRAINE PO) Take  by mouth.            medication changes made today: cont vyvanse, mirtazapine, buspar    2. Counseling and coordination of care including instructions for treatment, risks/benefits, risk factor reduction and patient/family education. He agrees with the plan. Patient instructed to call with any side effects, questions or issues. 3.    Follow-up and Dispositions    · Return in about 6 months (around 7/26/2021). Declan Acosta, who was evaluated through a synchronous (real-time) audio-video encounter, and/or his healthcare decision maker, is aware that it is a billable service, with coverage as determined by his insurance carrier. He provided verbal consent to proceed: Yes, and patient identification was verified. It was conducted pursuant to the emergency declaration under the [address] [place] [address], [address] [place] [address] authority and the [employer] General Act. A caregiver was present when appropriate. Ability to conduct physical exam was limited. I was in the office. The patient was at home.             1/26/2021  Carmita Cruz NP

## 2021-02-10 RX ORDER — MIRTAZAPINE 30 MG/1
TABLET, FILM COATED ORAL
Qty: 60 TAB | Refills: 5 | OUTPATIENT
Start: 2021-02-10

## 2021-02-10 NOTE — TELEPHONE ENCOUNTER
Requested Prescriptions     Pending Prescriptions Disp Refills    mirtazapine (REMERON) 30 mg tablet 60 Tab 5     Sig: TAKE TWO TABLETS BY MOUTH ONCE NIGHTLY       Last visit: 1/26, next fu is in 6 months

## 2021-03-23 DIAGNOSIS — F39 MOOD DISORDER (HCC): ICD-10-CM

## 2021-03-23 NOTE — TELEPHONE ENCOUNTER
Requested Prescriptions     Pending Prescriptions Disp Refills    lisdexamfetamine (Vyvanse) 30 mg capsule 30 Cap 0     Sig: Take 1 Cap by mouth daily. Max Daily Amount: 30 mg.      Last visit: 01/26/21    Next visit: 07/08/21      :  02/22/2021 1 01/26/2021 Vyvanse 30 Mg Capsule 30.00 30 Pa All [id number] [facility] (2500) [place]     01/20/2021 1 01/18/2021 Vyvanse 30 Mg Capsule 30.00 30 Pa All [id number] [facility] (2500) [place]

## 2021-04-26 DIAGNOSIS — F39 MOOD DISORDER (HCC): ICD-10-CM

## 2021-04-26 NOTE — TELEPHONE ENCOUNTER
30 day supply needed today and then he will call back for 90 day supply to [facility]   Pt calls and requests refill of Vyvanse 30 mg    Last OV  1/26/21  Next OV  7/8/21    Per  [facility] are:     Vyvanse 30mg       3/24  Qty  #30   2/22   Qty  #30   1/20   Qty  #30       HE STATES HE IS COMPLETELY OUT    If we could fill this today please. MOM calls back to say she wants Horace John to know he is doing so much better.

## 2021-05-19 RX ORDER — BUSPIRONE HYDROCHLORIDE 10 MG/1
TABLET ORAL
Qty: 30 TABLET | Refills: 2 | Status: SHIPPED | OUTPATIENT
Start: 2021-05-19 | End: 2021-06-16 | Stop reason: SDUPTHER

## 2021-05-20 DIAGNOSIS — F39 MOOD DISORDER (HCC): ICD-10-CM

## 2021-05-20 NOTE — TELEPHONE ENCOUNTER
Requested Prescriptions     Pending Prescriptions Disp Refills    lisdexamfetamine (Vyvanse) 30 mg capsule 30 Capsule 0     Sig: Take 1 Capsule by mouth daily. Max Daily Amount: 30 mg.      Last appt: 1/26  Next appt: 7/8

## 2021-05-20 NOTE — TELEPHONE ENCOUNTER
Jessenia Ramirez-    Per  last refill date was 4/26, so request was a bit early. I changed the earliest date to fill to 5/26. If you are okay with that then it is okay to fill from there.     Thanks-    Zaida

## 2021-06-14 ENCOUNTER — TELEPHONE (OUTPATIENT)
Dept: BEHAVIORAL/MENTAL HEALTH CLINIC | Age: 21
End: 2021-06-14

## 2021-06-14 NOTE — TELEPHONE ENCOUNTER
Returned call to patients mother Tiffanie Courtney who is on [facility] list. Tiffanie Courtney stated that patient's mood swings have increased since last visit. An example she gave was patient  built a koi pond. The pond was getting low on water. The patient filled the pond with chlorinated water using the garden hose, which killed the [place]. The patient sat in his room for weeks and would not come out other than to go to work. Mom also stated that patient has lost a substantial weight. She is thinking he is down to 140lb. Tiffanie Courtney stated patient does eat. She does not feel like he is limiting his food intake. Encouraged Tiffanie Courtney to have patient schedule a visit with his PCP.  Patient will be placed on the cancellation list.

## 2021-06-14 NOTE — TELEPHONE ENCOUNTER
Patient's mother called to speak to provider to give an update on the patient before his appt (7/8). She says the patient has loss an extreme amount of weight, but patient tells her he doesn't know why. His mood swings are getting worse. When she tries to talk to him, it sets him off, and he goes to his dark room. She reports she has a hard time understanding him and is requesting a recommendation for a counselor for her and him.

## 2021-06-16 ENCOUNTER — OFFICE VISIT (OUTPATIENT)
Dept: BEHAVIORAL/MENTAL HEALTH CLINIC | Age: 21
End: 2021-06-16
Payer: COMMERCIAL

## 2021-06-16 VITALS
DIASTOLIC BLOOD PRESSURE: 85 MMHG | TEMPERATURE: 97.9 F | OXYGEN SATURATION: 98 % | HEIGHT: 70 IN | WEIGHT: 140.2 LBS | BODY MASS INDEX: 20.07 KG/M2 | RESPIRATION RATE: 16 BRPM | HEART RATE: 100 BPM | SYSTOLIC BLOOD PRESSURE: 133 MMHG

## 2021-06-16 DIAGNOSIS — F90.8 ATTENTION DEFICIT HYPERACTIVITY DISORDER (ADHD), OTHER TYPE: ICD-10-CM

## 2021-06-16 DIAGNOSIS — F41.1 GENERALIZED ANXIETY DISORDER: ICD-10-CM

## 2021-06-16 DIAGNOSIS — F33.1 MODERATE RECURRENT MAJOR DEPRESSION (HCC): Primary | ICD-10-CM

## 2021-06-16 DIAGNOSIS — F39 MOOD DISORDER (HCC): ICD-10-CM

## 2021-06-16 PROCEDURE — 99214 OFFICE O/P EST MOD 30 MIN: CPT | Performed by: NURSE PRACTITIONER

## 2021-06-16 RX ORDER — ARIPIPRAZOLE 2 MG/1
2 TABLET ORAL DAILY
Qty: 30 TABLET | Refills: 2 | Status: SHIPPED | OUTPATIENT
Start: 2021-06-16 | End: 2021-09-20

## 2021-06-16 RX ORDER — BUSPIRONE HYDROCHLORIDE 15 MG/1
15 TABLET ORAL
Qty: 30 TABLET | Refills: 2 | Status: SHIPPED | OUTPATIENT
Start: 2021-06-16 | End: 2021-09-20

## 2021-06-16 NOTE — PROGRESS NOTES
CHIEF COMPLAINT:  Jessie Ramirez is a 24 y.o. male and was seen today for follow-up of psychiatric condition and psychotropic medication management. HPI:    Sandritashannan Avilesintosh reports the following psychiatric symptoms by hx:  depression, anxiety and adhd. Overall symptoms have been present for months. Currently symptoms are of moderate/high to high severity. The symptoms occur daily. Pt reports medications are somewhat beneficial. He states he has been dealing with increased stressors. Met with pt to review current treatment plan. FAMILY/SOCIAL HX: psychosocial stressors    REVIEW OF SYSTEMS:  Psychiatric symptoms being monitored for:  depression, ADHD, anxiety  Appetite:fair, eats approx 2 meals per day, has had approx    Sleep: no change, reports sleeping approx 6.5 hours   Neuro: denies any updates    Visit Vitals  /85 (BP 1 Location: Left arm, BP Patient Position: Sitting, BP Cuff Size: Adult)   Pulse 100   Temp 97.9 °F (36.6 °C) (Temporal)   Resp 16   Ht 5' 10\" (1.778 m)   Wt 63.6 kg (140 lb 3.2 oz)   SpO2 98%   BMI 20.12 kg/m²       Side Effects:  none    MENTAL STATUS EXAM:   Sensorium  oriented to time, place and person   Relations cooperative   Appearance:  age appropriate and casually dressed   Motor Behavior:  gait stable and within normal limits   Speech:  monotone and soft   Thought Process: goal directed   Thought Content free of delusions and free of hallucinations   Suicidal ideations no intention   Homicidal ideations no intention   Mood:  anxious and depressed   Affect:  anxious and depressed   Memory recent  adequate   Memory remote:  adequate   Concentration:  adequate   Abstraction:  abstract   Insight:  fair and limited   Reliability fair and limited   Judgment:  fair and limited     MEDICAL DECISION MAKING:  Problems addressed today:    ICD-10-CM ICD-9-CM    1. Moderate recurrent major depression (HCC)  F33.1 296.32    2. Generalized anxiety disorder  F41.1 300.02    3.  Attention deficit hyperactivity disorder (ADHD), other type  F90.8 314.01    4. Mood disorder ([facility]  F39 296.90        Assessment:   Rosa M Paulson is not responding to treatment. Symptoms are exacerbated. Pt reports approx 2 weeks ago he experienced 2 major stressors that contributed to depression. Discussed current medications and dosages. Will add low dose abilify to medications and increase buspar to 15 mg. Of note, pt has had a significant weight loss over the past 6 months (30 lbs). Pt does not have an explanation but states it has been gradual. Pt reports intermittent THC use in evening for anxiety/sleep. Discussed complexity of using THC with psychotropics and pt agrees to hold use at this time. Reviewed treatment goals and target symptoms to monitor for. Provided psychoeducation on management of recurrent depression and anxiety. Plan:   1. Current Outpatient Medications   Medication Sig Dispense Refill    busPIRone (BUSPAR) 15 mg tablet Take 1 Tablet by mouth nightly. 30 Tablet 2    ARIPiprazole (ABILIFY) 2 mg tablet Take 1 Tablet by mouth daily. 30 Tablet 2    lisdexamfetamine (Vyvanse) 30 mg capsule Take 1 Capsule by mouth daily. Max Daily Amount: 30 mg. 30 Capsule 0    mirtazapine (REMERON) 30 mg tablet TAKE TWO TABLETS BY MOUTH ONCE NIGHTLY 60 Tab 5    aspirin/acetaminophen/caffeine (EXCEDRIN MIGRAINE PO) Take  by mouth. (Patient not taking: Reported on 6/16/2021)            medication changes made today: cont mirtazapine, inc buspar, cont vyvanse, add abilify    2. Counseling and coordination of care including instructions for treatment, risks/benefits, risk factor reduction and patient/family education. He agrees with the plan. Patient instructed to call with any side effects, questions or issues. 3.    Follow-up and Dispositions    · Return in about 2 weeks (around 6/30/2021).          6/16/2021  Ara Muir NP

## 2021-06-16 NOTE — PROGRESS NOTES
Chief Complaint   Patient presents with    Medication Management     Visit Vitals  /85 (BP 1 Location: Left arm, BP Patient Position: Sitting, BP Cuff Size: Adult)   Pulse 100   Temp 97.9 °F (36.6 °C) (Temporal)   Resp 16   Ht 5' 10\" (1.778 m)   Wt 63.6 kg (140 lb 3.2 oz)   SpO2 98%   BMI 20.12 kg/m²     Patient had a 17 lb weight drop since January 2021. Patient states his anxiety has been bad, which may be the cause of the weight loss. He will eat at least one meal a day, usually dinner. Sometimes he will eat breakfast.         Prior to Admission medications    Medication Sig Start Date End Date Taking? Authorizing Provider   lisdexamfetamine (Vyvanse) 30 mg capsule Take 1 Capsule by mouth daily. Max Daily Amount: 30 mg. 5/26/21  Yes Moe Torres NP   busPIRone (BUSPAR) 10 mg tablet TAKE TABLET BY MOUTH ONCE NIGHTLY AS NEEDED FOR ANXIETY/SLEEP 5/19/21  Yes Moe Torres NP   mirtazapine (REMERON) 30 mg tablet TAKE TWO TABLETS BY MOUTH ONCE NIGHTLY 1/26/21  Yes Moe Torres NP   aspirin/acetaminophen/caffeine (EXCEDRIN MIGRAINE PO) Take  by mouth. Patient not taking: Reported on 6/16/2021    Provider, Historical       Patients mom (Maurice) called with concerns over weight loss and increased  mood swings on 6/14/21.

## 2021-06-22 DIAGNOSIS — F39 MOOD DISORDER (HCC): ICD-10-CM

## 2021-06-22 NOTE — TELEPHONE ENCOUNTER
Requested Prescriptions     Pending Prescriptions Disp Refills    lisdexamfetamine (Vyvanse) 30 mg capsule 30 Capsule 0     Sig: Take 1 Capsule by mouth daily. Max Daily Amount: 30 mg.      Last appt: 6/16  Next appt: 7/8

## 2021-06-22 NOTE — TELEPHONE ENCOUNTER
Last visit: 06.16.21  Next visit: 07.08.21      :  05/25/2021 1 05/20/2021 Vyvanse 30 Mg Capsule 30.00 30 Pa All [id number] [facility] (2500) 0  [place]    04/26/2021 1 04/26/2021 Vyvanse 30 Mg Capsule 30.00 30 Pa All [id number] [facility] (2500) 0  [place]

## 2021-07-22 ENCOUNTER — TELEPHONE (OUTPATIENT)
Dept: BEHAVIORAL/MENTAL HEALTH CLINIC | Age: 21
End: 2021-07-22

## 2021-07-22 DIAGNOSIS — F39 MOOD DISORDER (HCC): ICD-10-CM

## 2021-07-22 NOTE — TELEPHONE ENCOUNTER
Pt calls for refill of Vyvanse     06/22/2021 Vyvanse 30 Mg Capsule  30.00 30 Pa All     05/25/2021 Vyvanse 30 Mg Capsule  30.00    Last ov   6/16/21  Next ov   9/22/21

## 2021-08-18 DIAGNOSIS — F39 MOOD DISORDER (HCC): ICD-10-CM

## 2021-08-18 NOTE — TELEPHONE ENCOUNTER
Aaliyah Powers-    [employer] changed earliest fill date to the 22nd. OK to approve with that date.     Thanks-    Xiao Wright RN

## 2021-08-18 NOTE — TELEPHONE ENCOUNTER
Last visit: 06.16.21  Next visit: 09.22.21      ;  07/23/2021 1 07/22/2021 Vyvanse 30 Mg Capsule 30.00 30 Pa All     06/22/2021 1 06/22/2021 Vyvanse 30 Mg Capsule 30.00 30 Pa All

## 2021-09-17 DIAGNOSIS — F39 MOOD DISORDER (HCC): ICD-10-CM

## 2021-09-17 RX ORDER — MIRTAZAPINE 30 MG/1
TABLET, FILM COATED ORAL
Qty: 60 TABLET | Refills: 5 | Status: SHIPPED | OUTPATIENT
Start: 2021-09-17

## 2021-09-17 NOTE — TELEPHONE ENCOUNTER
[address] on track. Last fill 8/23/21. OK to fill Monday.     Thanks-    Porter Santanaing
Requested Prescriptions     Pending Prescriptions Disp Refills    lisdexamfetamine (Vyvanse) 30 mg capsule 30 Capsule 0     Sig: Take 1 Capsule by mouth daily.  Max Daily Amount: 30 mg.    mirtazapine (REMERON) 30 mg tablet 60 Tablet 5     Sig: TAKE TWO TABLETS BY MOUTH ONCE NIGHTLY     Last appt: 6/16  Next appt: 9/22
oral

## 2021-09-20 RX ORDER — ARIPIPRAZOLE 2 MG/1
TABLET ORAL
Qty: 30 TABLET | Refills: 2 | Status: SHIPPED | OUTPATIENT
Start: 2021-09-20

## 2021-09-20 RX ORDER — BUSPIRONE HYDROCHLORIDE 15 MG/1
TABLET ORAL
Qty: 30 TABLET | Refills: 2 | Status: SHIPPED | OUTPATIENT
Start: 2021-09-20

## 2021-09-21 ENCOUNTER — TELEPHONE (OUTPATIENT)
Dept: BEHAVIORAL/MENTAL HEALTH CLINIC | Age: 21
End: 2021-09-21

## 2021-09-21 DIAGNOSIS — F39 MOOD DISORDER (HCC): ICD-10-CM

## 2021-09-21 NOTE — TELEPHONE ENCOUNTER
Parent is inquiring about patient's vyvanse prescription. She says he called within 5 days to be within the allowed time fame, however, the [facility] pharmacy says they will not have in stock until tomorrow. She is inquiring as to why it cannot be mail-ordered with a 90 day supply, in order to avoid delays like this. Parent requests a callback.

## 2021-09-21 NOTE — TELEPHONE ENCOUNTER
Explained to parent that we do not do 90 day supply on controls. She says she knows some doctors will, and she is asking if it is time for him to find another doctor. She acknowledges that although it is not our fault the pharmacy is out of the medication, not having the medication on time is causing increased anxiety for the patient.

## 2021-09-21 NOTE — TELEPHONE ENCOUNTER
Spoke with patients mom (on PHI). [facility] does not have Vyvanse in stock. May take a week to come in. Requesting refill be sent to [facility].

## 2021-09-22 ENCOUNTER — OFFICE VISIT (OUTPATIENT)
Dept: BEHAVIORAL/MENTAL HEALTH CLINIC | Age: 21
End: 2021-09-22
Payer: COMMERCIAL

## 2021-09-22 VITALS
BODY MASS INDEX: 20.67 KG/M2 | RESPIRATION RATE: 18 BRPM | HEIGHT: 70 IN | SYSTOLIC BLOOD PRESSURE: 118 MMHG | TEMPERATURE: 97.7 F | HEART RATE: 102 BPM | OXYGEN SATURATION: 97 % | DIASTOLIC BLOOD PRESSURE: 86 MMHG | WEIGHT: 144.4 LBS

## 2021-09-22 DIAGNOSIS — F90.8 ATTENTION DEFICIT HYPERACTIVITY DISORDER (ADHD), OTHER TYPE: ICD-10-CM

## 2021-09-22 DIAGNOSIS — F41.1 GENERALIZED ANXIETY DISORDER: ICD-10-CM

## 2021-09-22 DIAGNOSIS — F33.1 MODERATE RECURRENT MAJOR DEPRESSION (HCC): Primary | ICD-10-CM

## 2021-09-22 PROCEDURE — 99214 OFFICE O/P EST MOD 30 MIN: CPT | Performed by: NURSE PRACTITIONER

## 2021-09-22 NOTE — PROGRESS NOTES
Chief Complaint   Patient presents with    Medication Management     Visit Vitals  /86 (BP 1 Location: Left arm, BP Patient Position: Sitting, BP Cuff Size: Adult)   Pulse (!) 102   Temp 97.7 °F (36.5 °C) (Temporal)   Resp 18   Ht 5' 10\" (1.778 m)   Wt 65.5 kg (144 lb 6.4 oz)   SpO2 97%   BMI 20.72 kg/m²     Prior to Admission medications    Medication Sig Start Date End Date Taking? Authorizing Provider   lisdexamfetamine (Vyvanse) 30 mg capsule Take 1 Capsule by mouth daily. Max Daily Amount: 30 mg. 9/21/21  Yes Allocca, Hyland Landau, NP   busPIRone (BUSPAR) 15 mg tablet TAKE 1 TABLET BY MOUTH EVERY DAY AT NIGHT 9/20/21  Yes Allocca, Hyland Landau, NP   mirtazapine (REMERON) 30 mg tablet TAKE TWO TABLETS BY MOUTH ONCE NIGHTLY 9/17/21  Yes Allocca, Hyland Landau, NP   ARIPiprazole (ABILIFY) 2 mg tablet TAKE 1 TABLET BY MOUTH EVERY DAY  Patient not taking: Reported on 9/22/2021 9/20/21   Allocca, Hyland Landau, NP   aspirin/acetaminophen/caffeine (EXCEDRIN MIGRAINE PO) Take  by mouth.   Patient not taking: Reported on 6/16/2021    Provider, Manpreet

## 2021-09-22 NOTE — PROGRESS NOTES
CHIEF COMPLAINT:  Katelyn Gonzalez is a 24 y.o. male and was seen today for follow-up of psychiatric condition and psychotropic medication management. HPI:    Margret Emily reports the following psychiatric symptoms by hx:  depression, anxiety and focus/concentration. Overall symptoms have been present for months. Currently symptoms are of moderate severity. The symptoms occur less frequently and less severely overall. Pt reports medications are beneficial. Met with pt for appt today to review current treatment plan. FAMILY/SOCIAL HX: psychosocial stressors    REVIEW OF SYSTEMS:  Psychiatric symptoms being monitored for:  depression, ADHD, anxiety  Appetite:good, reports eating x2 meals per day, weight stable from recent 30 lb weight loss at 144 lb   Sleep: improved   Neuro: no changes/updates    Visit Vitals  /86 (BP 1 Location: Left arm, BP Patient Position: Sitting, BP Cuff Size: Adult)   Pulse (!) 102   Temp 97.7 °F (36.5 °C) (Temporal)   Resp 18   Ht 5' 10\" (1.778 m)   Wt 65.5 kg (144 lb 6.4 oz)   SpO2 97%   BMI 20.72 kg/m²       Side Effects:  none    MENTAL STATUS EXAM:   Sensorium  oriented to time, place and person   Relations cooperative   Appearance:  age appropriate and casually dressed   Motor Behavior:  gait stable and within normal limits   Speech:  Soft, monotone   Thought Process: goal directed   Thought Content free of delusions and free of hallucinations   Suicidal ideations no intention   Homicidal ideations no intention   Mood:  depressed and irritable   Affect:  depressed and irritable   Memory recent  adequate   Memory remote:  adequate   Concentration:  adequate   Abstraction:  abstract   Insight:  limited   Reliability fair and limited   Judgment:  fair and limited     MEDICAL DECISION MAKING:  Problems addressed today:    ICD-10-CM ICD-9-CM    1. Moderate recurrent major depression (HCC)  F33.1 296.32    2. Generalized anxiety disorder  F41.1 300.02    3.  Attention deficit hyperactivity disorder (ADHD), other type  F90.8 314.01        Assessment:   Liza Sawant is responding to treatment. Symptoms are stabilizing overall. Discussed current medications and dosages. Pt angry/frustrated about this provider not providing early refills for stimulant. Reviewed guidelines we utilize for safe prescribing. Pt stated he ran out of medication early despite last fill date of vyvanse 8/23/21. Reviewed treatment goals and target symptoms to monitor for. Plan:   1. Current Outpatient Medications   Medication Sig Dispense Refill    lisdexamfetamine (Vyvanse) 30 mg capsule Take 1 Capsule by mouth daily. Max Daily Amount: 30 mg. 30 Capsule 0    busPIRone (BUSPAR) 15 mg tablet TAKE 1 TABLET BY MOUTH EVERY DAY AT NIGHT 30 Tablet 2    mirtazapine (REMERON) 30 mg tablet TAKE TWO TABLETS BY MOUTH ONCE NIGHTLY 60 Tablet 5    ARIPiprazole (ABILIFY) 2 mg tablet TAKE 1 TABLET BY MOUTH EVERY DAY (Patient not taking: Reported on 9/22/2021) 30 Tablet 2    aspirin/acetaminophen/caffeine (EXCEDRIN MIGRAINE PO) Take  by mouth. (Patient not taking: Reported on 6/16/2021)            medication changes made today: cont remeron, abilify, buspar and vyvanse    2. Counseling and coordination of care including instructions for treatment, risks/benefits, risk factor reduction and patient/family education. He agrees with the plan. Patient instructed to call with any side effects, questions or issues. 3.    Follow-up and Dispositions    · Return in about 4 months (around 1/22/2022).            9/22/2021  Merna Wilhelm NP

## 2023-05-19 RX ORDER — MIRTAZAPINE 30 MG/1
TABLET, FILM COATED ORAL
COMMUNITY
Start: 2021-09-17

## 2023-05-19 RX ORDER — BUSPIRONE HYDROCHLORIDE 15 MG/1
1 TABLET ORAL NIGHTLY
COMMUNITY
Start: 2021-09-20

## 2023-05-19 RX ORDER — ARIPIPRAZOLE 2 MG/1
1 TABLET ORAL DAILY
COMMUNITY
Start: 2021-09-20